# Patient Record
Sex: FEMALE | Race: WHITE | NOT HISPANIC OR LATINO | Employment: UNEMPLOYED | URBAN - METROPOLITAN AREA
[De-identification: names, ages, dates, MRNs, and addresses within clinical notes are randomized per-mention and may not be internally consistent; named-entity substitution may affect disease eponyms.]

---
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
## 2019-10-29 ENCOUNTER — HOSPITAL ENCOUNTER (EMERGENCY)
Facility: HOSPITAL | Age: 19
Discharge: HOME | End: 2019-10-29
Attending: EMERGENCY MEDICINE | Admitting: EMERGENCY MEDICINE

## 2019-10-29 VITALS
SYSTOLIC BLOOD PRESSURE: 125 MMHG | HEART RATE: 94 BPM | OXYGEN SATURATION: 98 % | RESPIRATION RATE: 18 BRPM | TEMPERATURE: 98.7 F | DIASTOLIC BLOOD PRESSURE: 81 MMHG | WEIGHT: 127 LBS | BODY MASS INDEX: 22.5 KG/M2 | HEIGHT: 63 IN

## 2019-10-29 DIAGNOSIS — L08.9 SKIN INFECTION: Primary | ICD-10-CM

## 2019-10-29 PROCEDURE — 99282 EMERGENCY DEPT VISIT SF MDM: CPT

## 2019-10-29 RX ORDER — ATOMOXETINE 40 MG/1
40 CAPSULE ORAL DAILY
COMMUNITY

## 2019-10-29 RX ORDER — QUETIAPINE FUMARATE 50 MG/1
25 TABLET, FILM COATED ORAL 4 TIMES DAILY
COMMUNITY

## 2019-10-29 RX ORDER — CEPHALEXIN 500 MG/1
500 CAPSULE ORAL 4 TIMES DAILY
Qty: 28 CAPSULE | Refills: 0 | Status: SHIPPED | OUTPATIENT
Start: 2019-10-29 | End: 2021-02-01 | Stop reason: HOSPADM

## 2021-01-31 ENCOUNTER — APPOINTMENT (OUTPATIENT)
Dept: GENERAL RADIOLOGY | Facility: HOSPITAL | Age: 21
End: 2021-01-31

## 2021-01-31 ENCOUNTER — HOSPITAL ENCOUNTER (OUTPATIENT)
Facility: HOSPITAL | Age: 21
Setting detail: OBSERVATION
Discharge: LEFT BEFORE DISCHARGE | End: 2021-02-01
Attending: EMERGENCY MEDICINE | Admitting: HOSPITALIST

## 2021-01-31 DIAGNOSIS — F19.90 IV DRUG USER: Primary | ICD-10-CM

## 2021-01-31 DIAGNOSIS — L03.113 CELLULITIS OF RIGHT UPPER EXTREMITY: ICD-10-CM

## 2021-01-31 DIAGNOSIS — L03.123 ACUTE LYMPHANGITIS OF RIGHT UPPER EXTREMITY: ICD-10-CM

## 2021-01-31 DIAGNOSIS — M65.9 TENOSYNOVITIS OF RIGHT WRIST: ICD-10-CM

## 2021-01-31 DIAGNOSIS — L02.511 ABSCESS OF RIGHT HAND: ICD-10-CM

## 2021-01-31 PROCEDURE — 73130 X-RAY EXAM OF HAND: CPT

## 2021-01-31 PROCEDURE — 96365 THER/PROPH/DIAG IV INF INIT: CPT

## 2021-01-31 PROCEDURE — 99284 EMERGENCY DEPT VISIT MOD MDM: CPT

## 2021-01-31 PROCEDURE — 96367 TX/PROPH/DG ADDL SEQ IV INF: CPT

## 2021-01-31 PROCEDURE — 96375 TX/PRO/DX INJ NEW DRUG ADDON: CPT

## 2021-01-31 PROCEDURE — 36415 COLL VENOUS BLD VENIPUNCTURE: CPT

## 2021-02-01 VITALS
TEMPERATURE: 98 F | RESPIRATION RATE: 18 BRPM | DIASTOLIC BLOOD PRESSURE: 84 MMHG | HEIGHT: 63 IN | BODY MASS INDEX: 22.2 KG/M2 | OXYGEN SATURATION: 98 % | HEART RATE: 72 BPM | WEIGHT: 125.3 LBS | SYSTOLIC BLOOD PRESSURE: 130 MMHG

## 2021-02-01 PROBLEM — F19.90 IV DRUG USER: Status: ACTIVE | Noted: 2021-02-01

## 2021-02-01 PROBLEM — L03.113 CELLULITIS OF RIGHT HAND: Status: ACTIVE | Noted: 2021-02-01

## 2021-02-01 LAB
ALBUMIN SERPL-MCNC: 4.1 G/DL (ref 3.5–5.2)
ALBUMIN/GLOB SERPL: 1.3 G/DL
ALP SERPL-CCNC: 93 U/L (ref 39–117)
ALT SERPL W P-5'-P-CCNC: 18 U/L (ref 1–33)
AMPHET+METHAMPHET UR QL: POSITIVE
ANION GAP SERPL CALCULATED.3IONS-SCNC: 11.4 MMOL/L (ref 5–15)
ANION GAP SERPL CALCULATED.3IONS-SCNC: 9 MMOL/L (ref 5–15)
AST SERPL-CCNC: 16 U/L (ref 1–32)
B-HCG UR QL: NEGATIVE
BARBITURATES UR QL SCN: NEGATIVE
BASOPHILS # BLD AUTO: 0.03 10*3/MM3 (ref 0–0.2)
BASOPHILS NFR BLD AUTO: 0.3 % (ref 0–1.5)
BENZODIAZ UR QL SCN: POSITIVE
BILIRUB SERPL-MCNC: 0.2 MG/DL (ref 0–1.2)
BILIRUB UR QL STRIP: NEGATIVE
BUN SERPL-MCNC: 4 MG/DL (ref 6–20)
BUN SERPL-MCNC: 5 MG/DL (ref 6–20)
BUN/CREAT SERPL: 8.5 (ref 7–25)
BUN/CREAT SERPL: 9.8 (ref 7–25)
CALCIUM SPEC-SCNC: 8.3 MG/DL (ref 8.6–10.5)
CALCIUM SPEC-SCNC: 9.4 MG/DL (ref 8.6–10.5)
CANNABINOIDS SERPL QL: POSITIVE
CHLORIDE SERPL-SCNC: 101 MMOL/L (ref 98–107)
CHLORIDE SERPL-SCNC: 105 MMOL/L (ref 98–107)
CLARITY UR: CLEAR
CO2 SERPL-SCNC: 27 MMOL/L (ref 22–29)
CO2 SERPL-SCNC: 27.6 MMOL/L (ref 22–29)
COCAINE UR QL: NEGATIVE
COLOR UR: YELLOW
CREAT SERPL-MCNC: 0.47 MG/DL (ref 0.57–1)
CREAT SERPL-MCNC: 0.51 MG/DL (ref 0.57–1)
D-LACTATE SERPL-SCNC: 1.8 MMOL/L (ref 0.5–2)
D-LACTATE SERPL-SCNC: 2.5 MMOL/L (ref 0.5–2)
DEPRECATED RDW RBC AUTO: 40.9 FL (ref 37–54)
DEPRECATED RDW RBC AUTO: 42.4 FL (ref 37–54)
EOSINOPHIL # BLD AUTO: 0.04 10*3/MM3 (ref 0–0.4)
EOSINOPHIL NFR BLD AUTO: 0.3 % (ref 0.3–6.2)
ERYTHROCYTE [DISTWIDTH] IN BLOOD BY AUTOMATED COUNT: 13 % (ref 12.3–15.4)
ERYTHROCYTE [DISTWIDTH] IN BLOOD BY AUTOMATED COUNT: 13.1 % (ref 12.3–15.4)
ETHANOL BLD-MCNC: <10 MG/DL (ref 0–10)
ETHANOL UR QL: <0.01 %
GFR SERPL CREATININE-BSD FRML MDRD: >150 ML/MIN/1.73
GFR SERPL CREATININE-BSD FRML MDRD: >150 ML/MIN/1.73
GLOBULIN UR ELPH-MCNC: 3.2 GM/DL
GLUCOSE SERPL-MCNC: 150 MG/DL (ref 65–99)
GLUCOSE SERPL-MCNC: 90 MG/DL (ref 65–99)
GLUCOSE UR STRIP-MCNC: NEGATIVE MG/DL
HCT VFR BLD AUTO: 33.9 % (ref 34–46.6)
HCT VFR BLD AUTO: 37.9 % (ref 34–46.6)
HGB BLD-MCNC: 11.9 G/DL (ref 12–15.9)
HGB BLD-MCNC: 13.3 G/DL (ref 12–15.9)
HGB UR QL STRIP.AUTO: NEGATIVE
IMM GRANULOCYTES # BLD AUTO: 0.05 10*3/MM3 (ref 0–0.05)
IMM GRANULOCYTES NFR BLD AUTO: 0.4 % (ref 0–0.5)
KETONES UR QL STRIP: NEGATIVE
LACTATE HOLD SPECIMEN: NORMAL
LEUKOCYTE ESTERASE UR QL STRIP.AUTO: NEGATIVE
LYMPHOCYTES # BLD AUTO: 1.56 10*3/MM3 (ref 0.7–3.1)
LYMPHOCYTES NFR BLD AUTO: 13.6 % (ref 19.6–45.3)
MCH RBC QN AUTO: 30.6 PG (ref 26.6–33)
MCH RBC QN AUTO: 30.9 PG (ref 26.6–33)
MCHC RBC AUTO-ENTMCNC: 35.1 G/DL (ref 31.5–35.7)
MCHC RBC AUTO-ENTMCNC: 35.1 G/DL (ref 31.5–35.7)
MCV RBC AUTO: 87.1 FL (ref 79–97)
MCV RBC AUTO: 87.9 FL (ref 79–97)
METHADONE UR QL SCN: NEGATIVE
MONOCYTES # BLD AUTO: 1.12 10*3/MM3 (ref 0.1–0.9)
MONOCYTES NFR BLD AUTO: 9.7 % (ref 5–12)
NEUTROPHILS NFR BLD AUTO: 75.7 % (ref 42.7–76)
NEUTROPHILS NFR BLD AUTO: 8.7 10*3/MM3 (ref 1.7–7)
NITRITE UR QL STRIP: NEGATIVE
NRBC BLD AUTO-RTO: 0 /100 WBC (ref 0–0.2)
OPIATES UR QL: NEGATIVE
OXYCODONE UR QL SCN: NEGATIVE
PH UR STRIP.AUTO: >=9 [PH] (ref 5–8)
PLATELET # BLD AUTO: 320 10*3/MM3 (ref 140–450)
PLATELET # BLD AUTO: 342 10*3/MM3 (ref 140–450)
PMV BLD AUTO: 10 FL (ref 6–12)
PMV BLD AUTO: 10.2 FL (ref 6–12)
POTASSIUM SERPL-SCNC: 3.1 MMOL/L (ref 3.5–5.2)
POTASSIUM SERPL-SCNC: 3.4 MMOL/L (ref 3.5–5.2)
PROCALCITONIN SERPL-MCNC: 0.06 NG/ML (ref 0–0.25)
PROT SERPL-MCNC: 7.3 G/DL (ref 6–8.5)
PROT UR QL STRIP: NEGATIVE
RBC # BLD AUTO: 3.89 10*6/MM3 (ref 3.77–5.28)
RBC # BLD AUTO: 4.31 10*6/MM3 (ref 3.77–5.28)
SARS-COV-2 ORF1AB RESP QL NAA+PROBE: DETECTED
SODIUM SERPL-SCNC: 140 MMOL/L (ref 136–145)
SODIUM SERPL-SCNC: 141 MMOL/L (ref 136–145)
SP GR UR STRIP: 1.01 (ref 1–1.03)
UROBILINOGEN UR QL STRIP: ABNORMAL
WBC # BLD AUTO: 10.35 10*3/MM3 (ref 3.4–10.8)
WBC # BLD AUTO: 11.5 10*3/MM3 (ref 3.4–10.8)

## 2021-02-01 PROCEDURE — 25010000002 PIPERACILLIN SOD-TAZOBACTAM PER 1 G: Performed by: NURSE PRACTITIONER

## 2021-02-01 PROCEDURE — 85027 COMPLETE CBC AUTOMATED: CPT | Performed by: NURSE PRACTITIONER

## 2021-02-01 PROCEDURE — 96365 THER/PROPH/DIAG IV INF INIT: CPT

## 2021-02-01 PROCEDURE — 85025 COMPLETE CBC W/AUTO DIFF WBC: CPT | Performed by: PHYSICIAN ASSISTANT

## 2021-02-01 PROCEDURE — 25010000002 KETOROLAC TROMETHAMINE PER 15 MG: Performed by: PHYSICIAN ASSISTANT

## 2021-02-01 PROCEDURE — 81025 URINE PREGNANCY TEST: CPT | Performed by: PHYSICIAN ASSISTANT

## 2021-02-01 PROCEDURE — 90471 IMMUNIZATION ADMIN: CPT | Performed by: PHYSICIAN ASSISTANT

## 2021-02-01 PROCEDURE — U0004 COV-19 TEST NON-CDC HGH THRU: HCPCS | Performed by: PHYSICIAN ASSISTANT

## 2021-02-01 PROCEDURE — G0378 HOSPITAL OBSERVATION PER HR: HCPCS

## 2021-02-01 PROCEDURE — 80307 DRUG TEST PRSMV CHEM ANLYZR: CPT | Performed by: PHYSICIAN ASSISTANT

## 2021-02-01 PROCEDURE — 80048 BASIC METABOLIC PNL TOTAL CA: CPT | Performed by: NURSE PRACTITIONER

## 2021-02-01 PROCEDURE — 25010000002 VANCOMYCIN 10 G RECONSTITUTED SOLUTION: Performed by: PHYSICIAN ASSISTANT

## 2021-02-01 PROCEDURE — 96375 TX/PRO/DX INJ NEW DRUG ADDON: CPT

## 2021-02-01 PROCEDURE — 25010000002 PIPERACILLIN SOD-TAZOBACTAM PER 1 G: Performed by: PHYSICIAN ASSISTANT

## 2021-02-01 PROCEDURE — C9803 HOPD COVID-19 SPEC COLLECT: HCPCS | Performed by: PHYSICIAN ASSISTANT

## 2021-02-01 PROCEDURE — 83605 ASSAY OF LACTIC ACID: CPT | Performed by: PHYSICIAN ASSISTANT

## 2021-02-01 PROCEDURE — 82077 ASSAY SPEC XCP UR&BREATH IA: CPT | Performed by: PHYSICIAN ASSISTANT

## 2021-02-01 PROCEDURE — 80053 COMPREHEN METABOLIC PANEL: CPT | Performed by: PHYSICIAN ASSISTANT

## 2021-02-01 PROCEDURE — 87040 BLOOD CULTURE FOR BACTERIA: CPT | Performed by: PHYSICIAN ASSISTANT

## 2021-02-01 PROCEDURE — 90715 TDAP VACCINE 7 YRS/> IM: CPT | Performed by: PHYSICIAN ASSISTANT

## 2021-02-01 PROCEDURE — 84145 PROCALCITONIN (PCT): CPT | Performed by: PHYSICIAN ASSISTANT

## 2021-02-01 PROCEDURE — 81003 URINALYSIS AUTO W/O SCOPE: CPT | Performed by: PHYSICIAN ASSISTANT

## 2021-02-01 PROCEDURE — 96367 TX/PROPH/DG ADDL SEQ IV INF: CPT

## 2021-02-01 PROCEDURE — 25010000002 TDAP 5-2.5-18.5 LF-MCG/0.5 SUSPENSION: Performed by: PHYSICIAN ASSISTANT

## 2021-02-01 RX ORDER — CLONIDINE HYDROCHLORIDE 0.1 MG/1
0.1 TABLET ORAL 3 TIMES DAILY PRN
Status: DISCONTINUED | OUTPATIENT
Start: 2021-02-03 | End: 2021-02-01 | Stop reason: HOSPADM

## 2021-02-01 RX ORDER — CLONIDINE HYDROCHLORIDE 0.1 MG/1
0.1 TABLET ORAL ONCE AS NEEDED
Status: DISCONTINUED | OUTPATIENT
Start: 2021-02-05 | End: 2021-02-01 | Stop reason: HOSPADM

## 2021-02-01 RX ORDER — SODIUM CHLORIDE 9 MG/ML
100 INJECTION, SOLUTION INTRAVENOUS CONTINUOUS
Status: DISCONTINUED | OUTPATIENT
Start: 2021-02-01 | End: 2021-02-01 | Stop reason: HOSPADM

## 2021-02-01 RX ORDER — ONDANSETRON 2 MG/ML
4 INJECTION INTRAMUSCULAR; INTRAVENOUS EVERY 6 HOURS PRN
Status: DISCONTINUED | OUTPATIENT
Start: 2021-02-01 | End: 2021-02-01 | Stop reason: HOSPADM

## 2021-02-01 RX ORDER — ACETAMINOPHEN 160 MG/5ML
650 SOLUTION ORAL EVERY 4 HOURS PRN
Status: DISCONTINUED | OUTPATIENT
Start: 2021-02-01 | End: 2021-02-01 | Stop reason: HOSPADM

## 2021-02-01 RX ORDER — ACETAMINOPHEN 325 MG/1
650 TABLET ORAL EVERY 4 HOURS PRN
Status: DISCONTINUED | OUTPATIENT
Start: 2021-02-01 | End: 2021-02-01 | Stop reason: HOSPADM

## 2021-02-01 RX ORDER — ONDANSETRON 4 MG/1
4 TABLET, FILM COATED ORAL EVERY 6 HOURS PRN
Status: DISCONTINUED | OUTPATIENT
Start: 2021-02-01 | End: 2021-02-01 | Stop reason: HOSPADM

## 2021-02-01 RX ORDER — KETOROLAC TROMETHAMINE 15 MG/ML
15 INJECTION, SOLUTION INTRAMUSCULAR; INTRAVENOUS ONCE
Status: COMPLETED | OUTPATIENT
Start: 2021-02-01 | End: 2021-02-01

## 2021-02-01 RX ORDER — CALCIUM CARBONATE 200(500)MG
2 TABLET,CHEWABLE ORAL 2 TIMES DAILY PRN
Status: DISCONTINUED | OUTPATIENT
Start: 2021-02-01 | End: 2021-02-01 | Stop reason: HOSPADM

## 2021-02-01 RX ORDER — BISACODYL 5 MG/1
5 TABLET, DELAYED RELEASE ORAL DAILY PRN
Status: DISCONTINUED | OUTPATIENT
Start: 2021-02-01 | End: 2021-02-01 | Stop reason: HOSPADM

## 2021-02-01 RX ORDER — CLONIDINE HYDROCHLORIDE 0.1 MG/1
0.1 TABLET ORAL 4 TIMES DAILY PRN
Status: DISCONTINUED | OUTPATIENT
Start: 2021-02-01 | End: 2021-02-01 | Stop reason: HOSPADM

## 2021-02-01 RX ORDER — POTASSIUM CHLORIDE 1.5 G/1.77G
40 POWDER, FOR SOLUTION ORAL AS NEEDED
Status: DISCONTINUED | OUTPATIENT
Start: 2021-02-01 | End: 2021-02-01 | Stop reason: HOSPADM

## 2021-02-01 RX ORDER — ACETAMINOPHEN 650 MG/1
650 SUPPOSITORY RECTAL EVERY 4 HOURS PRN
Status: DISCONTINUED | OUTPATIENT
Start: 2021-02-01 | End: 2021-02-01 | Stop reason: HOSPADM

## 2021-02-01 RX ORDER — CLONIDINE HYDROCHLORIDE 0.1 MG/1
0.1 TABLET ORAL 2 TIMES DAILY PRN
Status: DISCONTINUED | OUTPATIENT
Start: 2021-02-04 | End: 2021-02-01 | Stop reason: HOSPADM

## 2021-02-01 RX ORDER — LEVETIRACETAM 250 MG/1
150 TABLET ORAL EVERY 12 HOURS
COMMUNITY

## 2021-02-01 RX ORDER — POTASSIUM CHLORIDE 750 MG/1
40 TABLET, FILM COATED, EXTENDED RELEASE ORAL AS NEEDED
Status: DISCONTINUED | OUTPATIENT
Start: 2021-02-01 | End: 2021-02-01 | Stop reason: HOSPADM

## 2021-02-01 RX ORDER — QUETIAPINE FUMARATE 25 MG/1
25 TABLET, FILM COATED ORAL 4 TIMES DAILY
Status: DISCONTINUED | OUTPATIENT
Start: 2021-02-01 | End: 2021-02-01 | Stop reason: HOSPADM

## 2021-02-01 RX ORDER — KETOROLAC TROMETHAMINE 15 MG/ML
15 INJECTION, SOLUTION INTRAMUSCULAR; INTRAVENOUS EVERY 6 HOURS PRN
Status: DISCONTINUED | OUTPATIENT
Start: 2021-02-01 | End: 2021-02-01 | Stop reason: HOSPADM

## 2021-02-01 RX ORDER — POTASSIUM CHLORIDE 7.45 MG/ML
10 INJECTION INTRAVENOUS
Status: DISCONTINUED | OUTPATIENT
Start: 2021-02-01 | End: 2021-02-01 | Stop reason: HOSPADM

## 2021-02-01 RX ORDER — OXYCODONE HYDROCHLORIDE AND ACETAMINOPHEN 5; 325 MG/1; MG/1
1 TABLET ORAL ONCE
Status: COMPLETED | OUTPATIENT
Start: 2021-02-01 | End: 2021-02-01

## 2021-02-01 RX ORDER — CLONIDINE HYDROCHLORIDE 0.1 MG/1
0.1 TABLET ORAL 4 TIMES DAILY PRN
Status: DISCONTINUED | OUTPATIENT
Start: 2021-02-02 | End: 2021-02-01 | Stop reason: HOSPADM

## 2021-02-01 RX ORDER — AMOXICILLIN AND CLAVULANATE POTASSIUM 875; 125 MG/1; MG/1
1 TABLET, FILM COATED ORAL 2 TIMES DAILY
Qty: 28 TABLET | Refills: 0 | Status: SHIPPED | OUTPATIENT
Start: 2021-02-01 | End: 2021-02-15

## 2021-02-01 RX ORDER — LEVETIRACETAM 100 MG/ML
150 SOLUTION ORAL EVERY 12 HOURS
Status: DISCONTINUED | OUTPATIENT
Start: 2021-02-01 | End: 2021-02-01 | Stop reason: HOSPADM

## 2021-02-01 RX ORDER — SODIUM CHLORIDE 0.9 % (FLUSH) 0.9 %
10 SYRINGE (ML) INJECTION EVERY 12 HOURS SCHEDULED
Status: DISCONTINUED | OUTPATIENT
Start: 2021-02-01 | End: 2021-02-01 | Stop reason: HOSPADM

## 2021-02-01 RX ORDER — PRAZOSIN HYDROCHLORIDE 2 MG/1
2 CAPSULE ORAL NIGHTLY
COMMUNITY

## 2021-02-01 RX ORDER — SODIUM CHLORIDE 0.9 % (FLUSH) 0.9 %
10 SYRINGE (ML) INJECTION AS NEEDED
Status: DISCONTINUED | OUTPATIENT
Start: 2021-02-01 | End: 2021-02-01 | Stop reason: HOSPADM

## 2021-02-01 RX ORDER — ATOMOXETINE 40 MG/1
40 CAPSULE ORAL DAILY
Status: DISCONTINUED | OUTPATIENT
Start: 2021-02-01 | End: 2021-02-01 | Stop reason: HOSPADM

## 2021-02-01 RX ADMIN — TAZOBACTAM SODIUM AND PIPERACILLIN SODIUM 3.38 G: 375; 3 INJECTION, SOLUTION INTRAVENOUS at 00:35

## 2021-02-01 RX ADMIN — KETOROLAC TROMETHAMINE 15 MG: 15 INJECTION, SOLUTION INTRAMUSCULAR; INTRAVENOUS at 02:25

## 2021-02-01 RX ADMIN — VANCOMYCIN HYDROCHLORIDE 1250 MG: 10 INJECTION, POWDER, LYOPHILIZED, FOR SOLUTION INTRAVENOUS at 02:05

## 2021-02-01 RX ADMIN — TETANUS TOXOID, REDUCED DIPHTHERIA TOXOID AND ACELLULAR PERTUSSIS VACCINE, ADSORBED 0.5 ML: 5; 2.5; 8; 8; 2.5 SUSPENSION INTRAMUSCULAR at 00:23

## 2021-02-01 RX ADMIN — SODIUM CHLORIDE, PRESERVATIVE FREE 10 ML: 5 INJECTION INTRAVENOUS at 03:38

## 2021-02-01 RX ADMIN — POTASSIUM CHLORIDE 40 MEQ: 750 TABLET, EXTENDED RELEASE ORAL at 09:28

## 2021-02-01 RX ADMIN — TAZOBACTAM SODIUM AND PIPERACILLIN SODIUM 3.38 G: 375; 3 INJECTION, SOLUTION INTRAVENOUS at 06:07

## 2021-02-01 RX ADMIN — OXYCODONE HYDROCHLORIDE AND ACETAMINOPHEN 1 TABLET: 5; 325 TABLET ORAL at 00:57

## 2021-02-01 NOTE — ED PROVIDER NOTES
EMERGENCY DEPARTMENT ENCOUNTER    Room Number:  P678/1  Date of encounter:  2/1/2021  PCP: Provider, No Known  Historian: Patient      HPI:  Chief Complaint: Right hand and arm pain/infection  A complete HPI/ROS/PMH/PSH/SH/FH are unobtainable due to: Nothing    Context: Chaz Mao is a 20 y.o. female who presents to the ED c/o right hand and arm pain that has been ongoing for at least 24 hours.  Per the patient she is just getting back from the rehab facility in [place].  She felt she had a solid plan admit up with a little sury friend that are using heroin relatively quickly from the time she returned home.  She states she thinks her right arm got infected from injecting.  It initially started at the base/palm of the right hand.  A pustule develop followed by erythema.  Almost overnight erythema was said to be streaking proximally to the mid bicep region.  The patient states her entire arm hurts and is described as a 8 out of 10 on the pain scale, made worse with movement and slightly improves when holding it still.  The pain seems to radiate from the hand proximally.      Reviewing the patient's chart she has a history of anxiety, depression, polysubstance abuse.  She is taking Strattera and Seroquel.  She is not on OCPs and she is unsure of her LNMP.    PAST MEDICAL HISTORY  Active Ambulatory Problems     Diagnosis Date Noted   • No Active Ambulatory Problems     Resolved Ambulatory Problems     Diagnosis Date Noted   • No Resolved Ambulatory Problems     Past Medical History:   Diagnosis Date   • Anxiety    • Bipolar disorder (CMS/[facility])    • Depression    • Esophageal reflux    • Hearing loss 2015    • IV drug abuse (CMS/[facility])    • Pain    • Substance abuse (CMS/[facility])          PAST SURGICAL HISTORY  Past Surgical History:   Procedure Laterality Date   • HAND SURGERY     • WISDOM TOOTH EXTRACTION           FAMILY HISTORY  Family History   Adopted: Yes         SOCIAL HISTORY  Social History     Socioeconomic  History   • Marital status: Single     Spouse name: Not on file   • Number of children: Not on file   • Years of education: Not on file   • Highest education level: Not on file   Tobacco Use   • Smoking status: Current Every Day Smoker     Packs/day: 0.25     Types: Cigarettes   • Smokeless tobacco: Never Used   Substance and Sexual Activity   • Alcohol use: No     Frequency: Never     Comment: wine on occ   • Drug use: Yes     Types: Marijuana, IV     Comment: daily use   • Sexual activity: Yes     Partners: Male     Birth control/protection: I.U.D.     Comment: serum hcg is negative          ALLERGIES  Aripiprazole and Cariprazine hcl        REVIEW OF SYSTEMS  Review of Systems   Constitutional: Positive for chills. Negative for fever.   Eyes: Negative.    Respiratory: Negative.    Cardiovascular: Negative.    Gastrointestinal: Negative.    Genitourinary: Negative.    Skin: Positive for rash and wound.   Neurological: Negative.    Psychiatric/Behavioral: The patient is nervous/anxious.         All systems reviewed and negative except for those discussed in HPI.       PHYSICAL EXAM    I have reviewed the triage vital signs and nursing notes.    ED Triage Vitals   Temp Heart Rate Resp BP SpO2   01/31/21 2226 01/31/21 2226 01/31/21 2226 01/31/21 2233 01/31/21 2226   97.4 °F (36.3 °C) 114 16 149/93 95 %      Temp src Heart Rate Source Patient Position BP Location FiO2 (%)   01/31/21 2226 01/31/21 2226 -- -- --   Tympanic Monitor          Physical Exam  GENERAL: Well-nourished, nontoxic  HENT: nares patent  EYES: no scleral icterus  CV: regular rhythm, regular rate, no obvious murmur  RESPIRATORY: normal effort  ABDOMEN: soft  MUSCULOSKELETAL: Tissue swelling right hand and wrist.  Increased pain with slight extension and flexion of the wrist  NEURO: alert, moves all extremities, follows commands  SKIN: Small abscess over the proximal palmar aspect of the right hand with surrounding swelling and cellulitis.  There does  appear to be some streaking consistent with lymphangitis traveling up the forearm.  The patient also is noted to have some right axillary tenderness and lymphadenopathy.        LAB RESULTS  Recent Results (from the past 24 hour(s))   Comprehensive Metabolic Panel    Collection Time: 02/01/21 12:07 AM    Specimen: Arm, Left; Blood   Result Value Ref Range    Glucose 90 65 - 99 mg/dL    BUN 5 (L) 6 - 20 mg/dL    Creatinine 0.51 (L) 0.57 - 1.00 mg/dL    Sodium 140 136 - 145 mmol/L    Potassium 3.4 (L) 3.5 - 5.2 mmol/L    Chloride 101 98 - 107 mmol/L    CO2 27.6 22.0 - 29.0 mmol/L    Calcium 9.4 8.6 - 10.5 mg/dL    Total Protein 7.3 6.0 - 8.5 g/dL    Albumin 4.10 3.50 - 5.20 g/dL    ALT (SGPT) 18 1 - 33 U/L    AST (SGOT) 16 1 - 32 U/L    Alkaline Phosphatase 93 39 - 117 U/L    Total Bilirubin 0.2 0.0 - 1.2 mg/dL    eGFR Non African Amer >150 >60 mL/min/1.73    Globulin 3.2 gm/dL    A/G Ratio 1.3 g/dL    BUN/Creatinine Ratio 9.8 7.0 - 25.0    Anion Gap 11.4 5.0 - 15.0 mmol/L   Procalcitonin    Collection Time: 02/01/21 12:07 AM    Specimen: Arm, Left; Blood   Result Value Ref Range    Procalcitonin 0.06 0.00 - 0.25 ng/mL   Ethanol    Collection Time: 02/01/21 12:07 AM    Specimen: Arm, Left; Blood   Result Value Ref Range    Ethanol <10 0 - 10 mg/dL    Ethanol % <0.010 %   Lactic Acid, Plasma    Collection Time: 02/01/21 12:21 AM    Specimen: Blood   Result Value Ref Range    Lactate 2.5 (C) 0.5 - 2.0 mmol/L   CBC Auto Differential    Collection Time: 02/01/21 12:21 AM    Specimen: Blood   Result Value Ref Range    WBC 11.50 (H) 3.40 - 10.80 10*3/mm3    RBC 4.31 3.77 - 5.28 10*6/mm3    Hemoglobin 13.3 12.0 - 15.9 g/dL    Hematocrit 37.9 34.0 - 46.6 %    MCV 87.9 79.0 - 97.0 fL    MCH 30.9 26.6 - 33.0 pg    MCHC 35.1 31.5 - 35.7 g/dL    RDW 13.0 12.3 - 15.4 %    RDW-SD 42.4 37.0 - 54.0 fl    MPV 10.0 6.0 - 12.0 fL    Platelets 342 140 - 450 10*3/mm3    Neutrophil % 75.7 42.7 - 76.0 %    Lymphocyte % 13.6 (L) 19.6 - 45.3 %     Monocyte % 9.7 5.0 - 12.0 %    Eosinophil % 0.3 0.3 - 6.2 %    Basophil % 0.3 0.0 - 1.5 %    Immature Grans % 0.4 0.0 - 0.5 %    Neutrophils, Absolute 8.70 (H) 1.70 - 7.00 10*3/mm3    Lymphocytes, Absolute 1.56 0.70 - 3.10 10*3/mm3    Monocytes, Absolute 1.12 (H) 0.10 - 0.90 10*3/mm3    Eosinophils, Absolute 0.04 0.00 - 0.40 10*3/mm3    Basophils, Absolute 0.03 0.00 - 0.20 10*3/mm3    Immature Grans, Absolute 0.05 0.00 - 0.05 10*3/mm3    nRBC 0.0 0.0 - 0.2 /100 WBC   Lactic Acid, Reflex Timer (This will reflex a repeat order 3-3:15 hours after ordered.)    Collection Time: 02/01/21 12:21 AM    Specimen: Blood   Result Value Ref Range    Hold Tube Hold for add-ons.    Pregnancy, Urine - Urine, Clean Catch    Collection Time: 02/01/21  2:00 AM    Specimen: Urine, Clean Catch   Result Value Ref Range    HCG, Urine QL Negative Negative   Urinalysis With Microscopic If Indicated (No Culture) - Urine, Clean Catch    Collection Time: 02/01/21  2:00 AM    Specimen: Urine, Clean Catch   Result Value Ref Range    Color, UA Yellow Yellow, Straw    Appearance, UA Clear Clear    pH, UA >=9.0 (H) 5.0 - 8.0    Specific Gravity, UA 1.006 1.005 - 1.030    Glucose, UA Negative Negative    Ketones, UA Negative Negative    Bilirubin, UA Negative Negative    Blood, UA Negative Negative    Protein, UA Negative Negative    Leuk Esterase, UA Negative Negative    Nitrite, UA Negative Negative    Urobilinogen, UA 0.2 E.U./dL 0.2 - 1.0 E.U./dL   Urine Drug Screen - Urine, Clean Catch    Collection Time: 02/01/21  2:00 AM    Specimen: Urine, Clean Catch   Result Value Ref Range    Amphet/Methamphet, Screen Positive (A) Negative    Barbiturates Screen, Urine Negative Negative    Benzodiazepine Screen, Urine Positive (A) Negative    Cocaine Screen, Urine Negative Negative    Opiate Screen Negative Negative    THC, Screen, Urine Positive (A) Negative    Methadone Screen, Urine Negative Negative    Oxycodone Screen, Urine Negative Negative    Basic Metabolic Panel    Collection Time: 02/01/21  5:01 AM    Specimen: Blood   Result Value Ref Range    Glucose 150 (H) 65 - 99 mg/dL    BUN 4 (L) 6 - 20 mg/dL    Creatinine 0.47 (L) 0.57 - 1.00 mg/dL    Sodium 141 136 - 145 mmol/L    Potassium 3.1 (L) 3.5 - 5.2 mmol/L    Chloride 105 98 - 107 mmol/L    CO2 27.0 22.0 - 29.0 mmol/L    Calcium 8.3 (L) 8.6 - 10.5 mg/dL    eGFR Non African Amer >150 >60 mL/min/1.73    BUN/Creatinine Ratio 8.5 7.0 - 25.0    Anion Gap 9.0 5.0 - 15.0 mmol/L   CBC (No Diff)    Collection Time: 02/01/21  5:01 AM    Specimen: Blood   Result Value Ref Range    WBC 10.35 3.40 - 10.80 10*3/mm3    RBC 3.89 3.77 - 5.28 10*6/mm3    Hemoglobin 11.9 (L) 12.0 - 15.9 g/dL    Hematocrit 33.9 (L) 34.0 - 46.6 %    MCV 87.1 79.0 - 97.0 fL    MCH 30.6 26.6 - 33.0 pg    MCHC 35.1 31.5 - 35.7 g/dL    RDW 13.1 12.3 - 15.4 %    RDW-SD 40.9 37.0 - 54.0 fl    MPV 10.2 6.0 - 12.0 fL    Platelets 320 140 - 450 10*3/mm3   Lactic Acid, Reflex    Collection Time: 02/01/21  5:01 AM    Specimen: Blood   Result Value Ref Range    Lactate 1.8 0.5 - 2.0 mmol/L       Ordered the above labs and independently reviewed the results.        RADIOLOGY  Xr Hand 3+ View Right    Result Date: 2/1/2021  Patient: QUEENIE NELSON  Time Out: 00:01 Exam(s): FILM RIGHT HAND EXAM:   XR Right Hand Complete, 3 or More Views CLINICAL HISTORY:    IVD use r o fb  Reason for exam: IVD use r o fb. Reason for Exam:->IVD use r o fb. Patient Pregnant->No. Portable->No. Does this patient have a diabetic monitoring medication delivering device on?->No.. Additional notes: Images TECHNIQUE:   Frontal, lateral and oblique views of the right hand. COMPARISON:   No relevant prior studies available. FINDINGS:   Bones joints:  Unremarkable.  No acute fracture.  No dislocation.   Soft tissues:  No radiopaque foreign body. IMPRESSION:       No radiopaque foreign body.     Electronically signed by Trip Rodgers MD on 02-01-21 at 0001      I ordered the above  noted radiological studies. Reviewed by me and discussed with radiologist.  See dictation for official radiology interpretation.      PROCEDURES    Procedures      MEDICATIONS GIVEN IN ER    Medications   sodium chloride 0.9 % flush 10 mL (10 mL Intravenous Given 2/1/21 0338)   sodium chloride 0.9 % flush 10 mL (has no administration in time range)   acetaminophen (TYLENOL) tablet 650 mg (has no administration in time range)     Or   acetaminophen (TYLENOL) 160 MG/5ML solution 650 mg (has no administration in time range)     Or   acetaminophen (TYLENOL) suppository 650 mg (has no administration in time range)   bisacodyl (DULCOLAX) EC tablet 5 mg (has no administration in time range)   ondansetron (ZOFRAN) tablet 4 mg (has no administration in time range)     Or   ondansetron (ZOFRAN) injection 4 mg (has no administration in time range)   calcium carbonate (TUMS) chewable tablet 500 mg (200 mg elemental) (has no administration in time range)   Pharmacy to dose vancomycin (has no administration in time range)   Pharmacy to Dose Zosyn (has no administration in time range)   piperacillin-tazobactam (ZOSYN) 3.375 g in iso-osmotic dextrose 50 ml (premix) (has no administration in time range)   vancomycin 1250 mg/250 mL 0.9% NS IVPB ([facility]) (has no administration in time range)   Tdap (BOOSTRIX) injection 0.5 mL (0.5 mL Intramuscular Given 2/1/21 0023)   vancomycin 1250 mg/250 mL 0.9% NS IVPB ([facility]) (1,250 mg Intravenous New Bag 2/1/21 0205)   piperacillin-tazobactam (ZOSYN) 3.375 g in iso-osmotic dextrose 50 ml (premix) (0 g Intravenous Stopped 2/1/21 0109)   oxyCODONE-acetaminophen (PERCOCET) 5-325 MG per tablet 1 tablet (1 tablet Oral Given 2/1/21 0057)   ketorolac (TORADOL) injection 15 mg (15 mg Intravenous Given 2/1/21 0225)         PROGRESS, DATA ANALYSIS, CONSULTS, AND MEDICAL DECISION MAKING    All labs have been independently reviewed by me.  All radiology studies have been reviewed by me and discussed with  radiologist dictating the report.   EKG's independently viewed and interpreted by me.  Discussion below represents my analysis of pertinent findings related to patient's condition, differential diagnosis, treatment plan and final disposition.    The working diagnosis for this patient will be right hand abscess, right hand cellulitis, tenosynovitis of the right upper extremity, lymphangitis.  Given the patient's history of IV drug use and my concerns for compliance with outpatient management.  I feel this patient would be better admitted to the hospital where she can be further evaluated by hand and her symptoms treated.    ED Course as of Feb 01 0600   Mon Feb 01, 2021   0110 I discussed the patient's case with him for [place] BERNARD with A.  To place the patient under Dr. Serrano's care    [RC]      ED Course User Index  [RC] Obed Hernandez III, PA       Patient was placed in face mask in first look. Patient was wearing facemask when I entered the room and throughout our encounter. I wore full protective equipment throughout this patient encounter including a face mask, eye shield, gown and gloves. Hand hygiene was performed before donning protective equipment and after removal when leaving the room.    AS OF 06:00 EST VITALS:    BP - 122/75  HR - 84  TEMP - 97.5 °F (36.4 °C) (Oral)  O2 SATS - 97%        DIAGNOSIS  Final diagnoses:   IV drug user   Abscess of right hand   Cellulitis of right upper extremity   Acute lymphangitis of right upper extremity   Tenosynovitis of right wrist         DISPOSITION  ADMISSION    Discussed treatment plan and reason for admission with pt/family and admitting physician.  Pt/family voiced understanding of the plan for admission for further testing/treatment as needed.                Obed Hernandez III, PA  02/01/21 0600
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
## 2021-02-01 NOTE — ED NOTES
"This RN attempted x1 to obtain bloodwork from PT. While in process of obtaining bloodwork pt states, \"I can't do this right now. I can't have anything stick me. It feels like my whole  body is attacking me.\" RN will attempt again later.     Lashell Castro, RN  01/31/21 5204    "

## 2021-02-01 NOTE — PROGRESS NOTES
Discharge Planning Assessment  [facility]     Patient Name: Chaz Mao  MRN: [id number]  Today's Date: 2/1/2021    Admit Date: 1/31/2021    Discharge Needs Assessment    No documentation.       Discharge Plan     Row Name 02/01/21 1132       Plan    Plan  leaving against medical advice    Final Discharge Disposition Code  07 - left AMA    Final Note  Leaving against medical advice. Judd CROSS        Continued Care and Services - Admitted Since 1/31/2021    Coordination has not been started for this encounter.       Expected Discharge Date and Time     Expected Discharge Date Expected Discharge Time    Feb 1, 2021         Demographic Summary    No documentation.       Functional Status    No documentation.       Psychosocial    No documentation.       Abuse/Neglect    No documentation.       Legal    No documentation.       Substance Abuse    No documentation.       Patient Forms    No documentation.           Silvia Navarro, RN

## 2021-02-01 NOTE — ED NOTES
This RN wore gloves, mask, eye protection and all other necessary PPE while performing pt care.     Lashell Castro, RN  01/31/21 8209

## 2021-02-01 NOTE — DISCHARGE SUMMARY
Date of Admission: 1/31/2021  Date of Discharge:  2/1/2021  Primary Care Physician: Provider, No Known     Discharge Diagnosis:  Active Hospital Problems    Diagnosis  POA   • IV drug user [F19.90]  Yes   • Bipolar disorder (CMS/HCC) [F31.9]  Yes   • GERD without esophagitis [K21.9]  Yes   • Abscess of right hand [L02.511]  Yes   • Tenosynovitis of hand [M65.9]  Yes   • Acute lymphangitis of hand [L03.129]  Yes   • Hypokalemia [E87.6]  Yes   • Polysubstance abuse (CMS/HCC) [F19.10]  Yes   • Tobacco abuse [Z72.0]  Yes      Resolved Hospital Problems   No resolved problems to display.       DETAILS OF HOSPITAL STAY     Pertinent Test Results and Procedures Performed      Hospital Course  See admission note,  patient left AMA. After she left her COVID19 came back positive.  Both the nurse and myself have attempted to call patient cell phone but no answer.  Given the risk to the public in these unprecedented times, the decision was made to contact her parents. RN spoke with dad and I discussed the situation with the mom who will do her best to locate the daughter but according to the father she is hanging out with friends. It is highly concerning because patient left stating she was going to get on a flight to [place] as soon as possible. I also informed the mom that I sent a prescription to [facility] for her infection and it is imperative she complete the medication.     Physical Exam at Discharge:   see admission note.     Consults:   Consults     Date and Time Order Name Status Description    2/1/2021 0055 A (on-call MD unless specified) Details Completed             Condition on Discharge: Stable    Discharge Disposition  Left Against Medical Advice despite extensive discussion reviewing risks of leaving and not treating infection    Discharge Medications     Discharge Medications      New Medications      Instructions Start Date   amoxicillin-clavulanate 875-125 MG per tablet  Commonly known as: Augmentin    1 tablet, Oral, 2 Times Daily      salicylic acid-lactic acid 17 % external solution   Topical, Daily, Apply to warts and keep covered         Changes to Medications      Instructions Start Date   mupirocin 2 % ointment  Commonly known as: BACTROBAN  What changed: additional instructions   Topical, 3 Times Daily, Apply to R wrist wound         Continue These Medications      Instructions Start Date   atomoxetine 40 MG capsule  Commonly known as: STRATTERA   40 mg, Oral, Daily      Keppra 250 MG tablet  Generic drug: levETIRAcetam   150 mg, Oral, Every 12 Hours      prazosin 2 MG capsule  Commonly known as: MINIPRESS   2 mg, Oral, Nightly      QUEtiapine 50 MG tablet  Commonly known as: SEROquel   25 mg, Oral, 4 Times Daily, 25 mg in morning, 25 at 1300, 25 mg at 1500, 450 mg at hs         Stop These Medications    cephalexin 500 MG capsule  Commonly known as: KEFLEX            Discharge Diet:     Activity at Discharge:     Follow-up Appointments  No future appointments.      Test Results Pending at Discharge  Pending Labs     Order Current Status    Blood Culture - Blood, Arm, Left In process    Blood Culture - Blood, Arm, Left In process    COVID PRE-OP / PRE-PROCEDURE SCREENING ORDER (NO ISOLATION) - Swab, Nasopharynx In process    COVID-19,APTIMA PANTHER,APRIL IN-HOUSE, NP/OP SWAB IN UTM/VTM/SALINE TRANSPORT MEDIA,24 HR TAT - Swab, Nasopharynx In process          I have examined and discussed discharge planning with the patient today.    I wore full protective equipment throughout the patient encounter including eye protection and facemask.  Hand hygiene was performed before donning protective equipment and after removal when leaving the room.     MARCO ANTONIO Leonardo  02/01/21  11:23 EST    Time: Discharge time 30min since extensive time spent calling patient, family and discussing with RN

## 2021-02-01 NOTE — PROGRESS NOTES
"[facility]  Clinical Pharmacy Department     Vancomycin Pharmacokinetic Note    Chaz Mao is a 20 y.o. female who is on day 2 of pharmacy to dose vancomycin for SSTI.    Target level: AUC24 400-600  Consulting Provider:  Jade Yates.  Current Vancomycin Dose:   1, 250 mg (21.4 mg/kg) IV every  12  hours  Planned Duration of Therapy: 5 days  Other Antimicrobials: Zosyn    Allergies  Allergies as of 01/31/2021 - Reviewed 01/31/2021   Allergen Reaction Noted   • Aripiprazole Nausea And Vomiting 01/24/2018   • Cariprazine hcl Hives 01/28/2019       Microbiology:  Microbiology Results (last 10 days)     ** No results found for the last 240 hours. **          Vitals/Labs/I&O  160 cm (63\")  56.8 kg (125 lb 4.8 oz)  Body mass index is 22.2 kg/m².   Temp:  [97.4 °F (36.3 °C)-97.9 °F (36.6 °C)] 97.5 °F (36.4 °C)  Heart Rate:  [] 84  Resp:  [16-18] 18  BP: (122-149)/(75-94) 122/75    Results from last 7 days   Lab Units 02/01/21  0021   WBC 10*3/mm3 11.50*     Results from last 7 days   Lab Units 02/01/21  0021   LACTATE mmol/L 2.5*      Results from last 7 days   Lab Units 02/01/21  0007   PROCALCITONIN ng/mL 0.06                  Estimated Creatinine Clearance: 156.5 mL/min (A) (by C-G formula based on SCr of 0.51 mg/dL (L)).  Results from last 7 days   Lab Units 02/01/21  0007   BUN mg/dL 5*   CREATININE mg/dL 0.51*     Intake & Output (last 3 days)       01/29 0701 - 01/30 0700 01/30 0701 - 01/31 0700 01/31 0701 - 02/01 0700    IV Piggyback   50    Total Intake(mL/kg)   50 (0.9)    Urine (mL/kg/hr)   650    Total Output   650    Net   -600                 Vancomycin Dosing and Level History:  Last Dose Received in the ED/Outside Facility: 1,250mg  Is Patient on Dialysis or Renal Replacement: no  Inpatient Dosing History (date/time): 02-01-21 @ 02:05                  Assessment/Plan:    Assessment:  Bayesian analysis of the most recent level(s) using [employer] provides the following patient-specific " "pharmacokinetic parameters:   CL: 5.38 L/h   Vd: 48.6 L   T1/2: 6.59 h  If the predicted trough on this regimen is not within what was previously considered a \"target trough range\", the AUC24 (a stronger predictor of vancomycin efficacy) is predicted to achieve the accepted target of 400-600 mg*h/L. To best balance efficacy and toxicity, we will be targeting AUC24 in this patient rather than steady-state trough levels.     Initiating the regimen of 1,250 mg (21.4mg/kg) IV every 12 hours gives a predicted steady-state trough of 11.8 mg/L and AUC24 of 464 mg*h/L based upon population pharmacokinetics and this patient's specific parameters.    Recommendations/Plan:  -Initiate vancomycin 1,250 mg (21.4 mg/kg) IV every 12 hours   -Obtain vancomycin level on 02-02-21 prior to the 4th dose or sooner if acute changes   -Continue to monitor SCr    Thank you for involving pharmacy in this patient's care. Please contact pharmacy with any questions or concerns.                           Nilay Lanier [facility]  Clinical Pharmacist  02/01/21 03:56 EST    "

## 2021-02-01 NOTE — ED NOTES
Noticed pt standing at the bedside and tearful. She reports her right wrist wound in more painful with the Vancomycin infusing in the left arm. I assured the pt that it is not more painful because of the medication. I provided emotional support and was able to get her to lay back in the bed. I provided the pt a pillow to prop her arm on at this time. JESSICA Colón made aware. Meds for pain ordered.     Talita Jolly RN  02/01/21 0212

## 2021-02-01 NOTE — ED NOTES
Right hand pain and swelling. Pt has a wound on the palm of the hand with drainage noted.   Pt in mask in triage  Triage staff in appropriate PPE      Raquel Jolly RN  01/31/21 5642

## 2021-02-01 NOTE — PROGRESS NOTES
"Pharmacy Consult - Zosyn dosing    Chaz Mao is a 20 y.o. on whom pharmacy has been consulted dose Zosyn for SSTI.   Pharmacy dosing per Jade Lynch's request.         Relevant clinical data and objective history reviewed:  20 y.o. female 160 cm (63\")   56.8 kg (125 lb 4.8 oz)   Ideal body weight: 52.4 kg (115 lb 8.3 oz)  Adjusted ideal body weight: 54.2 kg (119 lb 6.9 oz)    Past medical history: has a past medical history of Anxiety, Bipolar disorder (CMS/[facility]), Depression, Esophageal reflux, Hearing loss 2015, IV drug abuse (CMS/[facility]), Pain, and Substance abuse (CMS/[facility]).  Allergies   Allergen Reactions   • Aripiprazole Nausea And Vomiting   • Cariprazine Hcl Hives       Other Antibiotics/Anti-infectives on profile: Vancomycin        Lab Results   Component Value Date    GLUCOSE 90 02/01/2021    CALCIUM 9.4 02/01/2021     02/01/2021    K 3.4 (L) 02/01/2021    CO2 27.6 02/01/2021     02/01/2021    BUN 5 (L) 02/01/2021    CREATININE 0.51 (L) 02/01/2021    EGFRIFNONA >150 02/01/2021    BCR 9.8 02/01/2021    ANIONGAP 11.4 02/01/2021     Lab Results   Component Value Date    WBC 11.50 (H) 02/01/2021    HGB 13.3 02/01/2021    HCT 37.9 02/01/2021    MCV 87.9 02/01/2021     02/01/2021       Estimated Creatinine Clearance: 156.5 mL/min (A) (by C-G formula based on SCr of 0.51 mg/dL (L)).  No intake/output data recorded.  Blood pressure 122/75, pulse 84, temperature 97.5 °F (36.4 °C), temperature source Oral, resp. rate 18, height 160 cm (63\"), weight 56.8 kg (125 lb 4.8 oz), last menstrual period 01/17/2021, SpO2 97 %.    .    Assessment/Plan    Will start patient on 3.375 gm q8h.  Pharmacy will continue to dose and monitor.       Nilay Lanier, [facility]    "

## 2021-02-01 NOTE — ED NOTES
Pt is no longer tearful and appears to have calmed down considerably.     Talita Jolly RN  02/01/21 0234

## 2021-02-01 NOTE — ED NOTES
Pt in mask & this RN in mask, gloves, & eye protection during care.      Beatrice Bustamante, RN  02/01/21 0155

## 2021-02-01 NOTE — ED NOTES
Pt unable to provide urine currently but is aware of need for sample.     Lashell Castro, RN  02/01/21 0009

## 2021-02-01 NOTE — ED NOTES
"Pt informs this RN that, \"I think I'm allergic to whatever is in this IV.\" When asked further, pt states, \"it is making my right arm hurt more!\"     Lashell Castro, RN  02/01/21 0100    "

## 2021-02-01 NOTE — ED NOTES
Nursing report ED to floor  Chaz Mao  20 y.o.  female    HPI (triage note):   Chief Complaint   Patient presents with   • Wound Check     right hand        Admitting doctor:   Laith Serrano MD    Admitting diagnosis:   The primary encounter diagnosis was IV drug user. Diagnoses of Abscess of right hand, Cellulitis of right upper extremity, Acute lymphangitis of right upper extremity, and Tenosynovitis of right wrist were also pertinent to this visit.    Code status:   Current Code Status     Date Active Code Status Order ID Comments User Context       2/1/2021 0113 CPR [id number]  Jade Yates APRN ED     Advance Care Planning Activity      Questions for Current Code Status     Question Answer Comment    Code Status CPR     Medical Interventions (Level of Support Prior to Arrest) Full           Allergies:   Aripiprazole and Cariprazine hcl    Weight:       01/31/21  2311   Weight: 58.3 kg (128 lb 8 oz)       Most recent vitals:   Vitals:    02/01/21 0115 02/01/21 0142 02/01/21 0147 02/01/21 0215   BP:  123/94     Pulse: 90  89    Resp:       Temp:    97.9 °F (36.6 °C)   TempSrc:    Tympanic   SpO2: 100% 96%     Weight:       Height:           Active LDAs/IV Access:   Lines, Drains & Airways    Active LDAs     Name:   Placement date:   Placement time:   Site:   Days:    Peripheral IV 02/01/21 0015 Left Antecubital   02/01/21 0015    Antecubital   less than 1                Labs (abnormal labs have a star):   Labs Reviewed   COMPREHENSIVE METABOLIC PANEL - Abnormal; Notable for the following components:       Result Value    BUN 5 (*)     Creatinine 0.51 (*)     Potassium 3.4 (*)     All other components within normal limits    Narrative:     GFR Normal >60  Chronic Kidney Disease <60  Kidney Failure <15     LACTIC ACID, PLASMA - Abnormal; Notable for the following components:    Lactate 2.5 (*)     All other components within normal limits   CBC WITH AUTO DIFFERENTIAL - Abnormal; Notable for the  "following components:    WBC 11.50 (*)     Lymphocyte % 13.6 (*)     Neutrophils, Absolute 8.70 (*)     Monocytes, Absolute 1.12 (*)     All other components within normal limits   URINALYSIS W/ MICROSCOPIC IF INDICATED (NO CULTURE) - Abnormal; Notable for the following components:    pH, UA >=9.0 (*)     All other components within normal limits    Narrative:     Urine microscopic not indicated.   PROCALCITONIN - Normal    Narrative:     As a Marker for Sepsis (Non-Neonates):   1. <0.5 ng/mL represents a low risk of severe sepsis and/or septic shock.  1. >2 ng/mL represents a high risk of severe sepsis and/or septic shock.    As a Marker for Lower Respiratory Tract Infections that require antibiotic therapy:  PCT on Admission     Antibiotic Therapy             6-12 Hrs later  > 0.5                Strongly Recommended            >0.25 - <0.5         Recommended  0.1 - 0.25           Discouraged                   Remeasure/reassess PCT  <0.1                 Strongly Discouraged          Remeasure/reassess PCT      As 28 day mortality risk marker: \"Change in Procalcitonin Result\" (> 80 % or <=80 %) if Day 0 (or Day 1) and Day 4 values are available. Refer to http://www.[employer]-pct-calculator.com/   Change in PCT <=80 %   A decrease of PCT levels below or equal to 80 % defines a positive change in PCT test result representing a higher risk for 28-day all-cause mortality of patients diagnosed with severe sepsis or septic shock.  Change in PCT > 80 %   A decrease of PCT levels of more than 80 % defines a negative change in PCT result representing a lower risk for 28-day all-cause mortality of patients diagnosed with severe sepsis or septic shock.                Results may be falsely decreased if patient taking Biotin.    PREGNANCY, URINE - Normal   BLOOD CULTURE   BLOOD CULTURE   COVID PRE-OP / PRE-PROCEDURE SCREENING ORDER (NO ISOLATION)    Narrative:     The following orders were created for panel order COVID PRE-OP / " PRE-PROCEDURE SCREENING ORDER (NO ISOLATION) - Swab, Nasopharynx.  Procedure                               Abnormality         Status                     ---------                               -----------         ------                     COVID-19,APTIMA PANTHER,...[[id number]]                      In process                   Please view results for these tests on the individual orders.   COVID-19,APTIMA PANTHER,APRIL IN-HOUSE,NP/OP SWAB IN UTM/VTM/SALINE TRANSPORT MEDIA,24 HR TAT   ETHANOL   URINE DRUG SCREEN   LACTIC ACID REFLEX TIMER   BASIC METABOLIC PANEL   CBC (NO DIFF)   CBC AND DIFFERENTIAL    Narrative:     The following orders were created for panel order CBC & Differential.  Procedure                               Abnormality         Status                     ---------                               -----------         ------                     CBC Auto Differential[[id number]]        Abnormal            Final result                 Please view results for these tests on the individual orders.       EKG:   No orders to display       Meds given in ED:   Medications   vancomycin 1250 mg/250 mL 0.9% NS IVPB ([facility]) (1,250 mg Intravenous New Bag 2/1/21 0205)   sodium chloride 0.9 % flush 10 mL (has no administration in time range)   sodium chloride 0.9 % flush 10 mL (has no administration in time range)   acetaminophen (TYLENOL) tablet 650 mg (has no administration in time range)     Or   acetaminophen (TYLENOL) 160 MG/5ML solution 650 mg (has no administration in time range)     Or   acetaminophen (TYLENOL) suppository 650 mg (has no administration in time range)   bisacodyl (DULCOLAX) EC tablet 5 mg (has no administration in time range)   ondansetron (ZOFRAN) tablet 4 mg (has no administration in time range)     Or   ondansetron (ZOFRAN) injection 4 mg (has no administration in time range)   calcium carbonate (TUMS) chewable tablet 500 mg (200 mg elemental) (has no administration in time range)   Pharmacy to  dose vancomycin (has no administration in time range)   Pharmacy to Dose Zosyn (has no administration in time range)   Tdap (BOOSTRIX) injection 0.5 mL (0.5 mL Intramuscular Given 2/1/21 0023)   piperacillin-tazobactam (ZOSYN) 3.375 g in iso-osmotic dextrose 50 ml (premix) (0 g Intravenous Stopped 2/1/21 0109)   oxyCODONE-acetaminophen (PERCOCET) 5-325 MG per tablet 1 tablet (1 tablet Oral Given 2/1/21 0057)   ketorolac (TORADOL) injection 15 mg (15 mg Intravenous Given 2/1/21 0225)       Imaging results:  Xr Hand 3+ View Right    Result Date: 2/1/2021  Electronically signed by Trip Rodgers MD on 02-01-21 at 0001      Ambulatory status:   - UP ad devon.    Social issues:   Social History     Socioeconomic History   • Marital status: Single     Spouse name: Not on file   • Number of children: Not on file   • Years of education: Not on file   • Highest education level: Not on file   Tobacco Use   • Smoking status: Current Every Day Smoker     Packs/day: 0.50     Types: Cigarettes   • Smokeless tobacco: Never Used   Substance and Sexual Activity   • Alcohol use: No     Frequency: Never   • Drug use: Yes     Types: Marijuana, IV     Comment: daily use   • Sexual activity: Yes     Partners: Male     Birth control/protection: I.U.D.     Comment: serum hcg is negative     Nursing report ED to Talita Mcelroy RN  02/01/21 9027

## 2021-02-01 NOTE — ED NOTES
"Pt informed this RN that she would like her IV to remain in place and to continue to receive antibiotics. Pt states that her R arm feels \"so much better.\"      Lashell Castro, RN  02/01/21 0155    "
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
## 2021-02-02 PROBLEM — U07.1 COVID-19 VIRUS DETECTED: Status: ACTIVE | Noted: 2021-02-02
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
## 2021-02-06 LAB
BACTERIA SPEC AEROBE CULT: NORMAL
BACTERIA SPEC AEROBE CULT: NORMAL